# Patient Record
Sex: FEMALE | Race: WHITE | NOT HISPANIC OR LATINO | Employment: OTHER | URBAN - METROPOLITAN AREA
[De-identification: names, ages, dates, MRNs, and addresses within clinical notes are randomized per-mention and may not be internally consistent; named-entity substitution may affect disease eponyms.]

---

## 2024-02-01 ENCOUNTER — APPOINTMENT (EMERGENCY)
Dept: RADIOLOGY | Facility: HOSPITAL | Age: 84
End: 2024-02-01
Payer: MEDICARE

## 2024-02-01 ENCOUNTER — HOSPITAL ENCOUNTER (EMERGENCY)
Facility: HOSPITAL | Age: 84
Discharge: HOME | End: 2024-02-01
Attending: EMERGENCY MEDICINE
Payer: MEDICARE

## 2024-02-01 VITALS
DIASTOLIC BLOOD PRESSURE: 55 MMHG | HEART RATE: 89 BPM | TEMPERATURE: 97.8 F | SYSTOLIC BLOOD PRESSURE: 107 MMHG | RESPIRATION RATE: 16 BRPM | OXYGEN SATURATION: 94 %

## 2024-02-01 DIAGNOSIS — M17.11 ARTHRITIS OF RIGHT KNEE: Primary | ICD-10-CM

## 2024-02-01 DIAGNOSIS — E87.6 HYPOKALEMIA: ICD-10-CM

## 2024-02-01 LAB
ANION GAP SERPL CALCULATED.3IONS-SCNC: 11 MMOL/L
BASOPHILS # BLD MANUAL: 0 THOUSAND/UL (ref 0–0.1)
BASOPHILS NFR MAR MANUAL: 0 % (ref 0–1)
BUN SERPL-MCNC: 12 MG/DL (ref 5–25)
CALCIUM SERPL-MCNC: 9.3 MG/DL (ref 8.4–10.2)
CHLORIDE SERPL-SCNC: 97 MMOL/L (ref 96–108)
CO2 SERPL-SCNC: 27 MMOL/L (ref 21–32)
CREAT SERPL-MCNC: 1.02 MG/DL (ref 0.6–1.3)
CRP SERPL QL: 226.5 MG/L
EOSINOPHIL # BLD MANUAL: 0 THOUSAND/UL (ref 0–0.4)
EOSINOPHIL NFR BLD MANUAL: 0 % (ref 0–6)
ERYTHROCYTE [DISTWIDTH] IN BLOOD BY AUTOMATED COUNT: 13.3 % (ref 11.6–15.1)
GFR SERPL CREATININE-BSD FRML MDRD: 51 ML/MIN/1.73SQ M
GLUCOSE SERPL-MCNC: 118 MG/DL (ref 65–140)
HCT VFR BLD AUTO: 37.7 % (ref 34.8–46.1)
HGB BLD-MCNC: 12.9 G/DL (ref 11.5–15.4)
LYMPHOCYTES # BLD AUTO: 1.49 THOUSAND/UL (ref 0.6–4.47)
LYMPHOCYTES # BLD AUTO: 8 % (ref 14–44)
MCH RBC QN AUTO: 30.4 PG (ref 26.8–34.3)
MCHC RBC AUTO-ENTMCNC: 34.2 G/DL (ref 31.4–37.4)
MCV RBC AUTO: 89 FL (ref 82–98)
MONOCYTES # BLD AUTO: 9.71 THOUSAND/UL (ref 0–1.22)
MONOCYTES NFR BLD: 52 % (ref 4–12)
NEUTROPHILS # BLD MANUAL: 7.47 THOUSAND/UL (ref 1.85–7.62)
NEUTS SEG NFR BLD AUTO: 40 % (ref 43–75)
PATHOLOGY REVIEW: YES
PLATELET # BLD AUTO: 203 THOUSANDS/UL (ref 149–390)
PLATELET BLD QL SMEAR: ADEQUATE
PMV BLD AUTO: 10.4 FL (ref 8.9–12.7)
POTASSIUM SERPL-SCNC: 2.7 MMOL/L (ref 3.5–5.3)
RBC # BLD AUTO: 4.25 MILLION/UL (ref 3.81–5.12)
RBC MORPH BLD: NORMAL
SODIUM SERPL-SCNC: 135 MMOL/L (ref 135–147)
URATE SERPL-MCNC: 6.6 MG/DL (ref 2–7.5)
WBC # BLD AUTO: 18.68 THOUSAND/UL (ref 4.31–10.16)

## 2024-02-01 PROCEDURE — 86140 C-REACTIVE PROTEIN: CPT | Performed by: EMERGENCY MEDICINE

## 2024-02-01 PROCEDURE — 85007 BL SMEAR W/DIFF WBC COUNT: CPT | Performed by: EMERGENCY MEDICINE

## 2024-02-01 PROCEDURE — 99284 EMERGENCY DEPT VISIT MOD MDM: CPT | Performed by: EMERGENCY MEDICINE

## 2024-02-01 PROCEDURE — 36415 COLL VENOUS BLD VENIPUNCTURE: CPT | Performed by: EMERGENCY MEDICINE

## 2024-02-01 PROCEDURE — 99284 EMERGENCY DEPT VISIT MOD MDM: CPT

## 2024-02-01 PROCEDURE — 85027 COMPLETE CBC AUTOMATED: CPT | Performed by: EMERGENCY MEDICINE

## 2024-02-01 PROCEDURE — 84550 ASSAY OF BLOOD/URIC ACID: CPT | Performed by: EMERGENCY MEDICINE

## 2024-02-01 PROCEDURE — 80048 BASIC METABOLIC PNL TOTAL CA: CPT | Performed by: EMERGENCY MEDICINE

## 2024-02-01 PROCEDURE — 73564 X-RAY EXAM KNEE 4 OR MORE: CPT

## 2024-02-01 PROCEDURE — 86618 LYME DISEASE ANTIBODY: CPT | Performed by: EMERGENCY MEDICINE

## 2024-02-01 RX ORDER — OXYCODONE HYDROCHLORIDE AND ACETAMINOPHEN 5; 325 MG/1; MG/1
1 TABLET ORAL ONCE
Status: COMPLETED | OUTPATIENT
Start: 2024-02-01 | End: 2024-02-01

## 2024-02-01 RX ORDER — POTASSIUM CHLORIDE 20 MEQ/1
20 TABLET, EXTENDED RELEASE ORAL 2 TIMES DAILY
Qty: 20 TABLET | Refills: 0 | Status: SHIPPED | OUTPATIENT
Start: 2024-02-01 | End: 2024-02-14

## 2024-02-01 RX ORDER — POTASSIUM CHLORIDE 20 MEQ/1
40 TABLET, EXTENDED RELEASE ORAL ONCE
Status: COMPLETED | OUTPATIENT
Start: 2024-02-01 | End: 2024-02-01

## 2024-02-01 RX ORDER — OXYCODONE HYDROCHLORIDE AND ACETAMINOPHEN 5; 325 MG/1; MG/1
1 TABLET ORAL EVERY 6 HOURS PRN
Qty: 20 TABLET | Refills: 0 | Status: SHIPPED | OUTPATIENT
Start: 2024-02-01 | End: 2024-02-14

## 2024-02-01 RX ADMIN — OXYCODONE HYDROCHLORIDE AND ACETAMINOPHEN 1 TABLET: 5; 325 TABLET ORAL at 13:02

## 2024-02-01 RX ADMIN — POTASSIUM CHLORIDE 40 MEQ: 1500 TABLET, EXTENDED RELEASE ORAL at 14:38

## 2024-02-01 NOTE — DISCHARGE INSTRUCTIONS
Potassium daily to improve your potassium  Percocet 1 every 6 hours if needed for pain caution narcotic will make you sleepy  Use the walker as needed  Follow-up with the orthopedist as planned

## 2024-02-01 NOTE — ED PROVIDER NOTES
History  Chief Complaint   Patient presents with    Knee Pain     Pt brought by EMS from home. Pt fell down the stairs 3-4 months ago fell on R knee, never got it evaluated. Pt c/o today 9/10 shooting pain. -BT, -HS, -LOC. Pt [place]. Hx HTN.      HPI patient is a 83-year-old female who describes right-sided knee pain for the last 3 to 4 months.  Patient apparently fell at home several months ago and reports since that she has had trouble with her right knee.  Patient apparently had opportunities to see an orthopedist but was unable to make the appointments.  Patient has an appointment with an orthopedist tomorrow but she is afraid it is going to [place] so they took an ambulance here.  Describes a sharp shooting pain in her knee points primarily to the area around her patella.  Complains of pain with range of motion.  Patient denies any fever or chills.  Denies any recent rash or tick bites.  Past medical history of hypertension  Family history noncontributory  Social history, non-smoker no history of drug abuse    None       Past Medical History:   Diagnosis Date    Hypertension        History reviewed. No pertinent surgical history.    History reviewed. No pertinent family history.  I have reviewed and agree with the history as documented.    E-Cigarette/Vaping    E-Cigarette Use Never User      E-Cigarette/Vaping Substances    Nicotine No     THC No     CBD No     Flavoring No     Other No     Unknown No      Social History     Tobacco Use    Smoking status: Never    Smokeless tobacco: Never   Vaping Use    Vaping status: Never Used   Substance Use Topics    Alcohol use: Never    Drug use: Never       Review of Systems   Constitutional:  Negative for chills and fever.   Reports chronic right knee pain    Physical Exam  Physical Exam  Vitals and nursing note reviewed.   Constitutional:       Appearance: She is well-developed.   HENT:      Head: Normocephalic.      Right Ear: External ear normal.      Left Ear: External  ear normal.      Nose: Nose normal.      Mouth/Throat:      Mouth: Mucous membranes are moist.      Pharynx: Oropharynx is clear.   Eyes:      General: Lids are normal.      Extraocular Movements: Extraocular movements intact.      Pupils: Pupils are equal, round, and reactive to light.   Pulmonary:      Effort: Pulmonary effort is normal. No respiratory distress.   Musculoskeletal:         General: No deformity.      Cervical back: Normal range of motion and neck supple.      Comments: Both knees showed no redness or swelling there is no joint effusion in the right knee, pain is worse with range of motion, no ligamentous instability no medial lateral instability anterior posterior disability negative Lachman's, neurovascular tendon intact.  No redness no warmth again no joint effusion   Skin:     General: Skin is warm and dry.   Neurological:      Mental Status: She is alert and oriented to person, place, and time.   Psychiatric:         Mood and Affect: Mood normal.         Vital Signs  ED Triage Vitals   Temperature Pulse Respirations Blood Pressure SpO2   02/01/24 1227 02/01/24 1218 02/01/24 1218 02/01/24 1218 02/01/24 1218   97.8 °F (36.6 °C) 89 16 107/55 94 %      Temp Source Heart Rate Source Patient Position - Orthostatic VS BP Location FiO2 (%)   02/01/24 1227 02/01/24 1218 02/01/24 1218 02/01/24 1218 --   Oral Monitor Sitting Left arm       Pain Score       02/01/24 1302       10 - Worst Possible Pain           Vitals:    02/01/24 1218   BP: 107/55   Pulse: 89   Patient Position - Orthostatic VS: Sitting         Visual Acuity      ED Medications  Medications   oxyCODONE-acetaminophen (PERCOCET) 5-325 mg per tablet 1 tablet (1 tablet Oral Given 2/1/24 1302)   potassium chloride (Klor-Con M20) CR tablet 40 mEq (40 mEq Oral Given 2/1/24 1438)       Diagnostic Studies  Results Reviewed       Procedure Component Value Units Date/Time    Path Slide Review [[id number]] Collected: 02/01/24 1258    Lab Status: In  process Specimen: Blood from Arm, Right Updated: 02/01/24 1540    Manual Differential(PHLEBS Do Not Order) [[id number]]  (Abnormal) Collected: 02/01/24 1258    Lab Status: Edited Result - FINAL Specimen: Blood from Arm, Right Updated: 02/01/24 1508     Segmented % 40 %      Lymphocytes % 8 %      Monocytes % 52 %      Eosinophils, % 0 %      Basophils % 0 %      Absolute Neutrophils 7.47 Thousand/uL      Lymphocytes Absolute 1.49 Thousand/uL      Monocytes Absolute 9.71 Thousand/uL      Eosinophils Absolute 0.00 Thousand/uL      Basophils Absolute 0.00 Thousand/uL      Total Counted --     RBC Morphology Normal     Platelet Estimate Adequate     Pathology Review Yes    Basic metabolic panel [[id number]]  (Abnormal) Collected: 02/01/24 1258    Lab Status: Final result Specimen: Blood from Arm, Right Updated: 02/01/24 1353     Sodium 135 mmol/L      Potassium 2.7 mmol/L      Chloride 97 mmol/L      CO2 27 mmol/L      ANION GAP 11 mmol/L      BUN 12 mg/dL      Creatinine 1.02 mg/dL      Glucose 118 mg/dL      Calcium 9.3 mg/dL      eGFR 51 ml/min/1.73sq m     Narrative:      [employer] guidelines for Chronic Kidney Disease (CKD):     Stage 1 with normal or high GFR (GFR > 90 mL/min/1.73 square meters)    Stage 2 Mild CKD (GFR = 60-89 mL/min/1.73 square meters)    Stage 3A Moderate CKD (GFR = 45-59 mL/min/1.73 square meters)    Stage 3B Moderate CKD (GFR = 30-44 mL/min/1.73 square meters)    Stage 4 Severe CKD (GFR = 15-29 mL/min/1.73 square meters)    Stage 5 End Stage CKD (GFR <15 mL/min/1.73 square meters)  Note: GFR calculation is accurate only with a steady state creatinine    C-reactive protein [[id number]]  (Abnormal) Collected: 02/01/24 1258    Lab Status: Final result Specimen: Blood from Arm, Right Updated: 02/01/24 1347     .5 mg/L     Uric acid [[id number]]  (Normal) Collected: 02/01/24 1258    Lab Status: Final result Specimen: Blood from Arm, Right Updated: 02/01/24 1347     Uric  Acid 6.6 mg/dL     Narrative:      N-acetyl-p-benzoquinone imine (metabolite of Acetaminophen) will generate erroneously low results in samples for patients that have taken an overdose of Acetaminophen.    CBC and differential [[id number]]  (Abnormal) Collected: 02/01/24 1258    Lab Status: Final result Specimen: Blood from Arm, Right Updated: 02/01/24 1316     WBC 18.68 Thousand/uL      RBC 4.25 Million/uL      Hemoglobin 12.9 g/dL      Hematocrit 37.7 %      MCV 89 fL      MCH 30.4 pg      MCHC 34.2 g/dL      RDW 13.3 %      MPV 10.4 fL      Platelets 203 Thousands/uL     Lyme Total AB W Reflex to IGM/IGG [[id number]] Collected: 02/01/24 1258    Lab Status: In process Specimen: Blood from Arm, Right Updated: 02/01/24 1304    Narrative:      The following orders were created for panel order Lyme Total AB W Reflex to IGM/IGG.  Procedure                               Abnormality         Status                     ---------                               -----------         ------                     Lyme Total AB W Reflex t...[[id number]]                      In process                   Please view results for these tests on the individual orders.    Lyme Total AB W Reflex to IGM/IGG [[id number]] Collected: 02/01/24 1258    Lab Status: In process Specimen: Blood from Arm, Right Updated: 02/01/24 1304                   XR knee 4+ vw right injury   Final Result by Tevin Marcelino DO (02/01 1549)      Tricompartmental degenerative changes most severely affecting the lateral compartment, mild to moderate elsewhere.      Numerous calcified intra-articular loose bodies suspected.      Workstation performed: [id number]                    Procedures  Procedures         ED Course         Discussed with patient potassium is low at 2.7 will replete.  She reports that she has potassium at home but has not been taking it because of the knee pain she does not like to take it.  X-ray of the right knee shows significant severe  arthritis, bone-on-bone, no real joint effusion, no fracture, interpreted by me appositional .  Discussed with patient Lyme's titer would be pending    CRP was elevated consistent with inflammation,      White count was elevated at 18 but the patient does have 50% monocytes nonspecific finding.  Correlate with infection.  Patient had no redness there is no warmth there is no joint effusion pain has been present for the last several months there is no indication for arthrocentesis.                              Medical Decision Making  Medical decision making 83-year-old female presents emergency department with 3 to 4 months of right knee pain, no joint effusion no redness no indication for arthrocentesis.  Discussed with patient x-ray consistent with significant arthritis, bone-on-bone.  Patient has an appointment with orthopedics tomorrow advise she follow-up as they can sometimes do things to lubricate the joint.  She is afraid of a snowstorm.  Patient was marked improved after Percocet here requested Percocet for home.  Patient was able to move and be more mobile after the Percocet but still wanted a transport home.  Discussed outpatient treatment and follow-up discussed indications to return.    Amount and/or Complexity of Data Reviewed  Labs: ordered.  Radiology: ordered.    Risk  Prescription drug management.             Disposition  Final diagnoses:   Arthritis of right knee   Hypokalemia     Time reflects when diagnosis was documented in both MDM as applicable and the Disposition within this note       Time User Action Codes Description Comment    2/1/2024  2:02 PM Mark Tang Add [M17.11] Arthritis of right knee     2/1/2024  2:05 PM Mark Tang Add [E87.6] Hypokalemia           ED Disposition       ED Disposition   Discharge    Condition   Stable    Date/Time   Thu Feb 1, 2024  2:02 PM    Comment   Emily Gracia discharge to home/self care.                   Follow-up Information     None         Discharge Medication List as of 2/1/2024  2:06 PM        START taking these medications    Details   oxyCODONE-acetaminophen (PERCOCET) 5-325 mg per tablet Take 1 tablet by mouth every 6 (six) hours as needed for moderate pain or severe pain for up to 30 doses Max Daily Amount: 4 tablets, Starting Thu 2/1/2024, Normal      potassium chloride (Klor-Con M20) 20 mEq tablet Take 1 tablet (20 mEq total) by mouth 2 (two) times a day, Starting u 2/1/2024, Normal             No discharge procedures on file.    PDMP Review       None            ED Provider  Electronically Signed by             Mark Tang MD  02/01/24 2038

## 2024-02-02 LAB — B BURGDOR IGG+IGM SER QL IA: NEGATIVE

## 2024-02-02 PROCEDURE — 85060 BLOOD SMEAR INTERPRETATION: CPT | Performed by: PATHOLOGY

## 2024-02-02 NOTE — ED RE-EVALUATION NOTE
I called and spoke with the patient's brother he reports she is sleeping and did not want to wake her up.  We discussed that her white count was high and that pathology was concerned about abnormal cells.  Patient will follow-up with her primary care physician about this and possibly see an oncologist.  The brother was understanding and in agreement with this.     Mark Tang MD  02/02/24 8727     accepted

## 2024-02-02 NOTE — ED RE-EVALUATION NOTE
I received a call from pathology patient has an abnormal blood smear.  I called the patient's contact number and the patient's brother contact number to inform and got voicemail.  Will attempt to call back again.  White count was noticed at the time of the visit it was not related to her joint pain.  Patient did have a monocytosis which pathology does feel is abnormal.  Again will attempt contact to ensure follow-up     Mark Tang MD  02/02/24 2858

## 2024-02-14 RX ORDER — POTASSIUM CHLORIDE 20 MEQ/1
20 TABLET, EXTENDED RELEASE ORAL 2 TIMES DAILY
Qty: 20 TABLET | Refills: 0 | Status: SHIPPED | OUTPATIENT
Start: 2024-02-14

## 2024-02-14 RX ORDER — OXYCODONE HYDROCHLORIDE AND ACETAMINOPHEN 5; 325 MG/1; MG/1
1 TABLET ORAL EVERY 6 HOURS PRN
Qty: 20 TABLET | Refills: 0 | Status: SHIPPED | OUTPATIENT
Start: 2024-02-14

## 2024-02-14 NOTE — ED RE-EVALUATION NOTE
Today's date is February 14, 2024.  Patient's brother called reporting that she is doing well but wanted a refill of her potassium.  I did call the [facility] and refilled the potassium.  Again I had advised the brother in the past that his sister requires follow-up for her abnormal white count.  Brother was understanding of this.  Apparently due to recent snow fall they were unable to see their primary care provider yet and ran out of Potassium.     Mark Tang MD  02/14/24 [id number]